# Patient Record
Sex: MALE | Race: BLACK OR AFRICAN AMERICAN
[De-identification: names, ages, dates, MRNs, and addresses within clinical notes are randomized per-mention and may not be internally consistent; named-entity substitution may affect disease eponyms.]

---

## 2018-05-10 ENCOUNTER — HOSPITAL ENCOUNTER (EMERGENCY)
Dept: HOSPITAL 62 - ER | Age: 23
Discharge: HOME | End: 2018-05-10
Payer: COMMERCIAL

## 2018-05-10 VITALS — SYSTOLIC BLOOD PRESSURE: 131 MMHG | DIASTOLIC BLOOD PRESSURE: 77 MMHG

## 2018-05-10 DIAGNOSIS — J02.0: Primary | ICD-10-CM

## 2018-05-10 DIAGNOSIS — R05: ICD-10-CM

## 2018-05-10 DIAGNOSIS — R09.81: ICD-10-CM

## 2018-05-10 PROCEDURE — 87880 STREP A ASSAY W/OPTIC: CPT

## 2018-05-10 PROCEDURE — 96372 THER/PROPH/DIAG INJ SC/IM: CPT

## 2018-05-10 PROCEDURE — 99283 EMERGENCY DEPT VISIT LOW MDM: CPT

## 2018-05-10 NOTE — ER DOCUMENT REPORT
ED ENT





- General


Chief Complaint: Sore Throat


Stated Complaint: SORE THROAT,CONGESTION,FEVER


Time Seen by Provider: 05/10/18 16:43


Mode of Arrival: Ambulatory


Information source: Patient


TRAVEL OUTSIDE OF THE [place] IN LAST 30 DAYS: No





- HPI


Patient complains to provider of: Throat problem


Notes: 





Patient is here with complaints of sore throat, nasal congestion, ear pain, 

cough, difficulty breathing through his nose for the last 3-4 days.  States 

that he is felt hot, but has not actually taken his temperature to see if he 

had a fever.  He denies any difficulty breathing or swallowing other than the 

fact that it hurts to swallow.  No nausea, vomiting, diarrhea.  No chest pain 

or shortness of breath.  No rash.  He denies any chronic medical problems.  No 

daily medications.  No rash.  No abdominal pain.  No known sick contacts.  Pain 

is worse with swallowing, nothing makes it better.





- Related Data


Allergies/Adverse Reactions: 


 





No Known Allergies Allergy (Verified 02/20/16 16:47)


 











Past Medical History





- Social History


Smoking Status: Unknown if Ever Smoked


Family History: Reviewed & Not Pertinent





Review of Systems





- Review of Systems


-: Yes All other systems reviewed and negative





Physical Exam





- Vital signs


Vitals: 


 











Temp Pulse Resp BP Pulse Ox


 


 98.5 F   106 H  18   131/77 H  97 


 


 05/10/18 16:34  05/10/18 16:34  05/10/18 16:34  05/10/18 16:34  05/10/18 16:34














- Notes


Notes: 





GENERAL: alert, cooperative, nontoxic, no distress.


HEAD: normocephalic, atraumatic


EYES: conjunctiva pink without discharge, no external redness or swelling.


EARS: no external swelling, no external redness, no mastoid redness, swelling, 

tenderness.  Ear canals are clear without swelling or drainage.  TMs pearly gray

, no redness, no bulging, normal landmarks, no perforation.  Effusions behind 

both TMs.


NOSE: atraumatic, no external swelling. clear rhinorrhea noted.


MOUTH/THROAT: mucous membranes moist and pink, posterior pharynx without 

exudate.  There is mild bilateral swelling of the tonsils.  Uvula is midline.  

No peritonsillar abscess.  No stridor.  Voice is normal.  No trismus or 

drooling.


NECK: soft, supple, full range of motion, no meningismus.  Anterior cervical 

lymphadenopathy bilaterally.


CHEST: no distress, lungs clear and equal throughout.  No wheezing, rales, 

rhonchi.


CARDIAC: regular rate and rhythm, no murmur, normal capillary refill, normal 

pulses.  No peripheral edema noted.


BACK: full range of motion, no CVA tenderness.


EXTREMITIES: full range of motion of all extremities.  No redness, no swelling.


NEURO: alert and oriented A&O3, no focal deficits, full range of motion of all 

extremities.


PYSCH: appropriate mood, affect.  Patient is cooperative.


SKIN: pink, warm, dry, no rash.





Course





- Re-evaluation


Re-evalutation: 





05/10/18 17:24


Patient is nontoxic appearing with stable vitals.  Here with complaints of sore 

throat as well as nasal congestion and cough.  On exam he is noted to have some 

swollen and erythematous tonsils.  No exudate.  Uvula is midline.  No 

peritonsillar abscess, no sign of retropharyngeal abscess.  No sign of 

epiglottitis.  The remainder of his exam is unremarkable.  Rapid strep is 

positive.  He was given a shot of Decadron as well as Bicillin here in the 

emergency department.  Patient will be discharged home with a prescription for 

Naprosyn.  Instructions to drink plenty of fluids.  Follow-up if not better in 

the next 3-5 days, sooner for worsening symptoms, high fever, difficulty 

breathing or swelling, or for any further concerns.





The patient is noted to have elevated blood pressure during today's emergency 

department visit.  The patient was informed of this finding.  The patient was 

instructed that this may be related to pre-hypertension and requires further 

evaluation with a primary care provider.  The patient has no hypertensive 

symptoms at this time.





The patient's emergency department workup and current diagnosis were explained 

to the patient and or family.  Follow-up instructions were provided.  

Medications if prescribed were discussed. Instructions for when to return to 

the emergency department including specific  worrisome symptoms were discussed 

with the patient and/or family.





- Vital Signs


Vital signs: 


 











Temp Pulse Resp BP Pulse Ox


 


 98.5 F   106 H  18   131/77 H  97 


 


 05/10/18 16:34  05/10/18 16:34  05/10/18 16:34  05/10/18 16:34  05/10/18 16:34














Discharge





- Discharge


Clinical Impression: 


 Strep pharyngitis





Condition: Stable


Disposition: HOME, SELF-CARE


Instructions:  Strep Throat ([facility])


Additional Instructions: 


Take medications as prescribed.  Take Tylenol as needed for pain.  Drink plenty 

fluids.  Change her toothbrush in 48 hours.  Follow-up if not improving in the 

next 3-5 days, sooner for worsening symptoms, high fever, difficulty breathing 

or swallowing, persistent vomiting, or for any further concerns.





Your blood pressure was elevated during today's visit.  Have this rechecked 

with your doctor.


Prescriptions: 


Naproxen [Naprosyn] 500 mg PO BID #20 tablet


Forms:  Elevated Blood Pressure, Smoking Cessation Education


Referrals: 


CAROLYN MORALES DO [ASSOCIATE] - Follow up as needed


[facility] [Provider Group] - Follow up as needed

## 2018-06-20 ENCOUNTER — HOSPITAL ENCOUNTER (EMERGENCY)
Dept: HOSPITAL 62 - ER | Age: 23
LOS: 1 days | Discharge: HOME | End: 2018-06-21
Payer: COMMERCIAL

## 2018-06-20 VITALS — SYSTOLIC BLOOD PRESSURE: 132 MMHG | DIASTOLIC BLOOD PRESSURE: 84 MMHG

## 2018-06-20 DIAGNOSIS — F17.210: ICD-10-CM

## 2018-06-20 DIAGNOSIS — K60.2: Primary | ICD-10-CM

## 2018-06-20 DIAGNOSIS — R11.2: ICD-10-CM

## 2018-06-20 DIAGNOSIS — K62.5: ICD-10-CM

## 2018-06-20 LAB
ADD MANUAL DIFF: NO
ALBUMIN SERPL-MCNC: 4.7 G/DL (ref 3.5–5)
ALP SERPL-CCNC: 65 U/L (ref 38–126)
ALT SERPL-CCNC: 27 U/L (ref 21–72)
ANION GAP SERPL CALC-SCNC: 14 MMOL/L (ref 5–19)
APPEARANCE UR: CLEAR
APTT PPP: YELLOW S
AST SERPL-CCNC: 27 U/L (ref 17–59)
BASOPHILS # BLD AUTO: 0 10^3/UL (ref 0–0.2)
BASOPHILS NFR BLD AUTO: 0.2 % (ref 0–2)
BILIRUB DIRECT SERPL-MCNC: 0.3 MG/DL (ref 0–0.4)
BILIRUB SERPL-MCNC: 0.6 MG/DL (ref 0.2–1.3)
BILIRUB UR QL STRIP: NEGATIVE
BUN SERPL-MCNC: 10 MG/DL (ref 7–20)
CALCIUM: 9.7 MG/DL (ref 8.4–10.2)
CHLORIDE SERPL-SCNC: 101 MMOL/L (ref 98–107)
CO2 SERPL-SCNC: 24 MMOL/L (ref 22–30)
EOSINOPHIL # BLD AUTO: 0.1 10^3/UL (ref 0–0.6)
EOSINOPHIL NFR BLD AUTO: 0.6 % (ref 0–6)
ERYTHROCYTE [DISTWIDTH] IN BLOOD BY AUTOMATED COUNT: 13.5 % (ref 11.5–14)
GLUCOSE SERPL-MCNC: 101 MG/DL (ref 75–110)
GLUCOSE UR STRIP-MCNC: NEGATIVE MG/DL
HCT VFR BLD CALC: 44.9 % (ref 37.9–51)
HGB BLD-MCNC: 14.8 G/DL (ref 13.5–17)
KETONES UR STRIP-MCNC: 20 MG/DL
LIPASE SERPL-CCNC: 87.3 U/L (ref 23–300)
LYMPHOCYTES # BLD AUTO: 2.1 10^3/UL (ref 0.5–4.7)
LYMPHOCYTES NFR BLD AUTO: 18.9 % (ref 13–45)
MCH RBC QN AUTO: 26.2 PG (ref 27–33.4)
MCHC RBC AUTO-ENTMCNC: 32.9 G/DL (ref 32–36)
MCV RBC AUTO: 80 FL (ref 80–97)
MONOCYTES # BLD AUTO: 0.8 10^3/UL (ref 0.1–1.4)
MONOCYTES NFR BLD AUTO: 7.4 % (ref 3–13)
NEUTROPHILS # BLD AUTO: 7.9 10^3/UL (ref 1.7–8.2)
NEUTS SEG NFR BLD AUTO: 72.9 % (ref 42–78)
NITRITE UR QL STRIP: NEGATIVE
PH UR STRIP: 6 [PH] (ref 5–9)
PLATELET # BLD: 262 10^3/UL (ref 150–450)
POTASSIUM SERPL-SCNC: 4.2 MMOL/L (ref 3.6–5)
PROT SERPL-MCNC: 8.8 G/DL (ref 6.3–8.2)
PROT UR STRIP-MCNC: NEGATIVE MG/DL
RBC # BLD AUTO: 5.63 10^6/UL (ref 4.35–5.55)
SODIUM SERPL-SCNC: 138.6 MMOL/L (ref 137–145)
SP GR UR STRIP: 1.03
TOTAL CELLS COUNTED % (AUTO): 100 %
UROBILINOGEN UR-MCNC: 2 MG/DL (ref ?–2)
WBC # BLD AUTO: 10.9 10^3/UL (ref 4–10.5)

## 2018-06-20 PROCEDURE — 83690 ASSAY OF LIPASE: CPT

## 2018-06-20 PROCEDURE — 85025 COMPLETE CBC W/AUTO DIFF WBC: CPT

## 2018-06-20 PROCEDURE — 80053 COMPREHEN METABOLIC PANEL: CPT

## 2018-06-20 PROCEDURE — 36415 COLL VENOUS BLD VENIPUNCTURE: CPT

## 2018-06-20 PROCEDURE — 81001 URINALYSIS AUTO W/SCOPE: CPT

## 2018-06-20 PROCEDURE — 99283 EMERGENCY DEPT VISIT LOW MDM: CPT

## 2018-06-21 NOTE — ER DOCUMENT REPORT
ED General





<SHANE VALENCIA - Last Filed: 06/21/18 00:46>





- General


Mode of Arrival: Ambulatory


Information source: Patient


TRAVEL OUTSIDE OF THE [place] IN LAST 30 DAYS: No





<CHIP ANDRADE - Last Filed: 06/21/18 00:59>





- General


Chief Complaint: Rectal Bleeding


Stated Complaint: RECTUM BLEEDING


Time Seen by Provider: 06/21/18 00:29


Notes: 


Patient is a 23 year old male presenting to the emergency department 

complaining of rectal bleeding onset today. Patient states he did a rectal 

douche today with only hydrogen peroxide to cleanse his colon. Patient states 

after the cleanse he noticed profuse rectal bleeding and became nauseous. He 

states he has had 2 vomiting episodes today. He denies any fevers. 


 (CHIP ANDRADE)





- Related Data


Allergies/Adverse Reactions: 


 





No Known Allergies Allergy (Verified 02/20/16 16:47)


 











Past Medical History





- General


Information source: Patient





- Social History


Smoking Status: Current Some Day Smoker


Cigarette use (# per day): Yes


Chew tobacco use (# tins/day): No


Frequency of alcohol use: Occasional


Family History: Reviewed & Not Pertinent


Renal/ Medical History: Denies: Hx Peritoneal Dialysis





<CHIP ANDRADE - Last Filed: 06/21/18 00:59>





Review of Systems





- Review of Systems


Constitutional: No symptoms reported


EENT: No symptoms reported


Cardiovascular: No symptoms reported


Respiratory: No symptoms reported


Gastrointestinal: See HPI, Nausea, Vomiting, Rectal bleeding


Genitourinary: No symptoms reported


Male Genitourinary: No symptoms reported


Musculoskeletal: No symptoms reported


Skin: No symptoms reported


Hematologic/Lymphatic: No symptoms reported


Neurological/Psychological: No symptoms reported


-: Yes All other systems reviewed and negative





<CHIP ANDRADE - Last Filed: 06/21/18 00:59>





Physical Exam





<SHANE VALENCIA - Last Filed: 06/21/18 00:46>





- General


General appearance: Appears well, Alert


In distress: None





- HEENT


Head: Normocephalic, Atraumatic


Eyes: Normal


Conjunctiva: Normal


Extraocular movements intact: Yes


Pupils: PERRL


Neck: Normal





- Respiratory


Respiratory status: No respiratory distress





- Cardiovascular


Rhythm: Regular


Heart sounds: Normal auscultation


Murmur: No


Friction rub: No


Gallop: None auscultated





- Back


Back: Normal





- Extremities


General upper extremity: Normal ROM


General lower extremity: Normal ROM





- Neurological


Neuro grossly intact: Yes


Cognition: Normal


Orientation: AAOx4


Sommer Coma Scale Eye Opening: Spontaneous


Sommer Coma Scale Verbal: Oriented


Sommer Coma Scale Motor: Obeys Commands


Sommer Coma Scale Total: 15


Speech: Normal





- Psychological


Associated symptoms: Normal affect, Normal mood





- Skin


Skin Temperature: Warm


Skin Moisture: Dry


Skin Color: Normal





<CHIP ANDRADE - Last Filed: 06/21/18 00:59>





- Vital signs


Vitals: 


 











Temp Pulse Resp BP Pulse Ox


 


 98.2 F   73   16   132/84 H  98 


 


 06/20/18 23:20  06/20/18 23:20  06/20/18 23:20  06/20/18 23:20  06/20/18 23:20














- Rectal


Notes: 





Anal fissure at the 6 o'clock in the knee to chest position. (CHIP ANDRADE)





Course





- Laboratory


Result Diagrams: 


 06/20/18 23:05





 06/20/18 23:05





<SHANE VALENCIA - Last Filed: 06/21/18 00:46>





- Laboratory


Result Diagrams: 


 06/20/18 23:05





 06/20/18 23:05





<CHIP ANDRADE - Last Filed: 06/21/18 00:59>





- Vital Signs


Vital signs: 


 











Temp Pulse Resp BP Pulse Ox


 


 98.2 F   73   16   132/84 H  98 


 


 06/20/18 23:20  06/20/18 23:20  06/20/18 23:20  06/20/18 23:20  06/20/18 23:20














- Laboratory


Laboratory results interpreted by me: 


 











  06/20/18 06/20/18 06/20/18





  23:05 23:05 23:05


 


WBC  10.9 H  


 


RBC  5.63 H  


 


MCH  26.2 L  


 


Total Protein   8.8 H 


 


Urine Ketones    20 H


 


Urine Urobilinogen    2.0 H


 


Urine Ascorbic Acid    40 H














Discharge





<SHANE VALENCIA - Last Filed: 06/21/18 00:46>





<CHIP ANDRADE - Last Filed: 06/21/18 00:59>





- Discharge


Clinical Impression: 


 Anal fissure





Condition: Stable


Disposition: HOME, SELF-CARE


Additional Instructions: 


Anal Fissure





     You have a split in the tissues of the anus, called an anal fissure.  This 

may be due to constipation, or chronic anal irritation. The fissure causes pain 

during bowel movements.  It may bleed when you pass stool.


     Treat the fissure with warm sitz baths three or four times a day. Clean 

the anal area carefully -- special cleansing pads (Tucks) may be helpful.  

Sometimes prescription suppositories are helpful in reducing pain and 

inflammation.  A stool softener (such as Metamucil) will make bowel movements 

less traumatic.  Eat a diet high in fiber (fruits, whole grains), and drink 

plenty of water.


     See your doctor if there is profuse bleeding, increasing pain, an 

enlarging mass, or fever -- or if the symptoms do not resolve after treatment.








********************************************************************************

*****************************





FOLLOW UP WITH [facility] IF NOT IMPROVING.





RETURN IF ANY NEW OR WORSENING SYMPTOMS.


Referrals: 


[facility] [Provider Group] - Follow up as needed


Scribe Attestation: 





06/21/18 00:48


I personally performed the services described in the documentation, reviewed 

and edited the documentation which was dictated to the scribe in my presence, 

and it accurately records my words and actions. (SHANE VALENCIA)





Scribe Documentation





- Scribe


Written by Lobo:: Lobo Platt, 6/21/2018 00:58


acting as scribe for :: Matt





<CHIP ANDRADE - Last Filed: 06/21/18 00:59>